# Patient Record
Sex: FEMALE | Race: ASIAN | NOT HISPANIC OR LATINO
[De-identification: names, ages, dates, MRNs, and addresses within clinical notes are randomized per-mention and may not be internally consistent; named-entity substitution may affect disease eponyms.]

---

## 2019-10-09 ENCOUNTER — APPOINTMENT (OUTPATIENT)
Dept: UROLOGY | Facility: CLINIC | Age: 59
End: 2019-10-09
Payer: COMMERCIAL

## 2019-10-09 VITALS
SYSTOLIC BLOOD PRESSURE: 130 MMHG | RESPIRATION RATE: 17 BRPM | TEMPERATURE: 98.2 F | DIASTOLIC BLOOD PRESSURE: 82 MMHG | HEART RATE: 84 BPM | HEIGHT: 62 IN | WEIGHT: 115 LBS | BODY MASS INDEX: 21.16 KG/M2 | OXYGEN SATURATION: 97 %

## 2019-10-09 PROBLEM — Z00.00 ENCOUNTER FOR PREVENTIVE HEALTH EXAMINATION: Status: ACTIVE | Noted: 2019-10-09

## 2019-10-09 PROCEDURE — 99204 OFFICE O/P NEW MOD 45 MIN: CPT

## 2019-10-16 ENCOUNTER — APPOINTMENT (OUTPATIENT)
Age: 59
End: 2019-10-16
Payer: COMMERCIAL

## 2019-10-16 VITALS
SYSTOLIC BLOOD PRESSURE: 132 MMHG | HEART RATE: 81 BPM | BODY MASS INDEX: 20.49 KG/M2 | DIASTOLIC BLOOD PRESSURE: 78 MMHG | TEMPERATURE: 98.3 F | OXYGEN SATURATION: 98 % | RESPIRATION RATE: 17 BRPM | WEIGHT: 112 LBS

## 2019-10-16 DIAGNOSIS — R31.29 OTHER MICROSCOPIC HEMATURIA: ICD-10-CM

## 2019-10-16 PROCEDURE — 52000 CYSTOURETHROSCOPY: CPT

## 2019-10-16 NOTE — ASSESSMENT
[FreeTextEntry1] : 58 yo F with microhematuria\par \par - Reviewed labwork from PCP\par - Discussed possible etiologies for microhematuria including benign (UTI, nephrolithiasis, cyst, BPH) vs malignancy (renal, ureteral and bladder). Discussed hematuria workup which includes CT urogram and cystoscopy. Discussed risk and benefits of cystoscopy.\par

## 2019-10-16 NOTE — PHYSICAL EXAM
[General Appearance - Well Developed] : well developed [General Appearance - Well Nourished] : well nourished [Normal Appearance] : normal appearance [General Appearance - In No Acute Distress] : no acute distress [Well Groomed] : well groomed [Edema] : no peripheral edema [Exaggerated Use Of Accessory Muscles For Inspiration] : no accessory muscle use [Abdomen Soft] : soft [Respiration, Rhythm And Depth] : normal respiratory rhythm and effort [Abdomen Tenderness] : non-tender [Costovertebral Angle Tenderness] : no ~M costovertebral angle tenderness [Urethral Meatus] : normal urethra [Urinary Bladder Findings] : the bladder was normal on palpation [External Female Genitalia] : normal external genitalia [No Focal Deficits] : no focal deficits [Normal Station and Gait] : the gait and station were normal for the patient's age [] : no rash [Affect] : the affect was normal [Oriented To Time, Place, And Person] : oriented to person, place, and time [Mood] : the mood was normal [Not Anxious] : not anxious [No Palpable Adenopathy] : no palpable adenopathy [FreeTextEntry1] : neg CST, no prolapse

## 2019-10-16 NOTE — HISTORY OF PRESENT ILLNESS
[FreeTextEntry1] : 60 yo Mongolian speaking F referred for asymptomatic microhematuria\par no dysuria, no gross hematuria\par no recurrent uti, no history of stones\par no incontinence\par Drinks barely 1 cup of water, 3 large cups of coffee, no tea or soda\par normal bowel movements\par LMP 8 yrs ago\par not sexually active\par 2 children,

## 2021-01-04 DIAGNOSIS — Q40.3 CONGENITAL MALFORMATION OF STOMACH, UNSPECIFIED: ICD-10-CM

## 2021-01-23 ENCOUNTER — APPOINTMENT (OUTPATIENT)
Dept: DISASTER EMERGENCY | Facility: CLINIC | Age: 61
End: 2021-01-23

## 2021-01-23 NOTE — PRE PROCEDURE NOTE - PRE PROCEDURE EVALUATION
Attending Physician:     Jeramy                       Procedure: EGD EUS    Indication for Procedure: 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  ________________________________________________________  PAST MEDICAL & SURGICAL HISTORY:    ALLERGIES:  Allergy Status Unknown    HOME MEDICATIONS:    AICD/PPM: [ ] yes   [ ] no    PERTINENT LAB DATA:                      PHYSICAL EXAMINATION:    T(C): --  HR: --  BP: --  RR: --  SpO2: --    Constitutional: NAD  HEENT: PERRLA, EOMI,    Neck:  No JVD  Respiratory: CTAB/L  Cardiovascular: S1 and S2  Gastrointestinal: BS+, soft, NT/ND  Extremities: No peripheral edema  Neurological: A/O x 3, no focal deficits  Psychiatric: Normal mood, normal affect  Skin: No rashes    ASA Class: I [ ]  II [ ]  III [ ]  IV [ ]    COMMENTS:    The patient is a suitable candidate for the planned procedure unless box checked [ ]  No, explain:     Attending Physician:   Jonathan                   Procedure: EGD EUS    Indication for Procedure: 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  ________________________________________________________  PAST MEDICAL & SURGICAL HISTORY:    ALLERGIES:  Allergy Status Unknown    HOME MEDICATIONS:    AICD/PPM: [ ] yes   [ ] no    PERTINENT LAB DATA:                      PHYSICAL EXAMINATION:    T(C): --  HR: --  BP: --  RR: --  SpO2: --    Constitutional: NAD  HEENT: PERRLA, EOMI,    Neck:  No JVD  Respiratory: CTAB/L  Cardiovascular: S1 and S2  Gastrointestinal: BS+, soft, NT/ND  Extremities: No peripheral edema  Neurological: A/O x 3, no focal deficits  Psychiatric: Normal mood, normal affect  Skin: No rashes    ASA Class: I [ ]  II [ ]  III [ ]  IV [ ]    COMMENTS:    The patient is a suitable candidate for the planned procedure unless box checked [ ]  No, explain:

## 2021-01-26 ENCOUNTER — APPOINTMENT (OUTPATIENT)
Dept: GASTROENTEROLOGY | Facility: HOSPITAL | Age: 61
End: 2021-01-26

## 2021-01-26 ENCOUNTER — OUTPATIENT (OUTPATIENT)
Dept: OUTPATIENT SERVICES | Facility: HOSPITAL | Age: 61
LOS: 1 days | End: 2021-01-26
Payer: MEDICAID

## 2021-01-26 ENCOUNTER — RESULT REVIEW (OUTPATIENT)
Age: 61
End: 2021-01-26

## 2021-01-26 VITALS — SYSTOLIC BLOOD PRESSURE: 106 MMHG | DIASTOLIC BLOOD PRESSURE: 66 MMHG | OXYGEN SATURATION: 99 % | HEART RATE: 78 BPM

## 2021-01-26 VITALS
SYSTOLIC BLOOD PRESSURE: 114 MMHG | RESPIRATION RATE: 19 BRPM | OXYGEN SATURATION: 99 % | HEIGHT: 62 IN | TEMPERATURE: 97 F | HEART RATE: 87 BPM | WEIGHT: 111.99 LBS | DIASTOLIC BLOOD PRESSURE: 62 MMHG

## 2021-01-26 DIAGNOSIS — Q40.3 CONGENITAL MALFORMATION OF STOMACH, UNSPECIFIED: ICD-10-CM

## 2021-01-26 LAB — SARS-COV-2 N GENE NPH QL NAA+PROBE: NOT DETECTED

## 2021-01-26 PROCEDURE — 43259 EGD US EXAM DUODENUM/JEJUNUM: CPT

## 2021-01-26 PROCEDURE — 43239 EGD BIOPSY SINGLE/MULTIPLE: CPT

## 2021-01-26 PROCEDURE — 88305 TISSUE EXAM BY PATHOLOGIST: CPT | Mod: 26

## 2021-01-26 PROCEDURE — 88312 SPECIAL STAINS GROUP 1: CPT | Mod: 26

## 2021-01-26 PROCEDURE — 43239 EGD BIOPSY SINGLE/MULTIPLE: CPT | Mod: GC

## 2021-01-26 PROCEDURE — 88305 TISSUE EXAM BY PATHOLOGIST: CPT

## 2021-01-26 PROCEDURE — 43259 EGD US EXAM DUODENUM/JEJUNUM: CPT | Mod: GC

## 2021-01-26 PROCEDURE — 88312 SPECIAL STAINS GROUP 1: CPT

## 2021-01-26 RX ORDER — SODIUM CHLORIDE 9 MG/ML
500 INJECTION INTRAMUSCULAR; INTRAVENOUS; SUBCUTANEOUS
Refills: 0 | Status: COMPLETED | OUTPATIENT
Start: 2021-01-26 | End: 2021-01-26

## 2021-01-26 RX ADMIN — SODIUM CHLORIDE 30 MILLILITER(S): 9 INJECTION INTRAMUSCULAR; INTRAVENOUS; SUBCUTANEOUS at 08:48

## 2021-01-26 NOTE — ASU PATIENT PROFILE, ADULT - PMH
No pertinent past medical history <<----- Click to add NO pertinent Past Medical History Epigastric pain

## 2021-01-26 NOTE — PRE-ANESTHESIA EVALUATION ADULT - NSANTHOSAYNRD_GEN_A_CORE
No. SYDNIE screening performed.  STOP BANG Legend: 0-2 = LOW Risk; 3-4 = INTERMEDIATE Risk; 5-8 = HIGH Risk

## 2021-01-26 NOTE — ASU DISCHARGE PLAN (ADULT/PEDIATRIC) - NSDISCH_GASTRIC_ENDO_ALL_CORE_FT
You might feel 'gassy' or "crampy'' for a few hours. This is because air was put into the stomach during the procedure. However, if you have continued abdominal (stomach) pain or swelling, contact your doctor right away.
soft/nontender

## 2021-02-09 PROBLEM — R10.13 EPIGASTRIC PAIN: Chronic | Status: ACTIVE | Noted: 2021-01-26

## 2021-02-20 ENCOUNTER — APPOINTMENT (OUTPATIENT)
Dept: DISASTER EMERGENCY | Facility: CLINIC | Age: 61
End: 2021-02-20

## 2021-02-21 LAB — SARS-COV-2 N GENE NPH QL NAA+PROBE: NOT DETECTED

## 2021-02-22 DIAGNOSIS — Z01.812 ENCOUNTER FOR PREPROCEDURAL LABORATORY EXAMINATION: ICD-10-CM

## 2021-02-23 ENCOUNTER — APPOINTMENT (OUTPATIENT)
Dept: GASTROENTEROLOGY | Facility: HOSPITAL | Age: 61
End: 2021-02-23

## 2021-02-28 DIAGNOSIS — Z01.818 ENCOUNTER FOR OTHER PREPROCEDURAL EXAMINATION: ICD-10-CM

## 2021-02-28 RX ORDER — SODIUM CHLORIDE 9 MG/ML
500 INJECTION, SOLUTION INTRAVENOUS
Refills: 0 | Status: DISCONTINUED | OUTPATIENT
Start: 2021-03-04 | End: 2021-03-18

## 2021-03-01 ENCOUNTER — APPOINTMENT (OUTPATIENT)
Dept: DISASTER EMERGENCY | Facility: CLINIC | Age: 61
End: 2021-03-01

## 2021-03-02 LAB — SARS-COV-2 N GENE NPH QL NAA+PROBE: NOT DETECTED

## 2021-03-04 ENCOUNTER — OUTPATIENT (OUTPATIENT)
Dept: OUTPATIENT SERVICES | Facility: HOSPITAL | Age: 61
LOS: 1 days | Discharge: HOME | End: 2021-03-04
Payer: MEDICAID

## 2021-03-04 ENCOUNTER — RESULT REVIEW (OUTPATIENT)
Age: 61
End: 2021-03-04

## 2021-03-04 ENCOUNTER — APPOINTMENT (OUTPATIENT)
Dept: GASTROENTEROLOGY | Facility: HOSPITAL | Age: 61
End: 2021-03-04

## 2021-03-04 VITALS
OXYGEN SATURATION: 99 % | HEART RATE: 77 BPM | WEIGHT: 100.09 LBS | SYSTOLIC BLOOD PRESSURE: 113 MMHG | DIASTOLIC BLOOD PRESSURE: 68 MMHG | TEMPERATURE: 98 F | RESPIRATION RATE: 19 BRPM | HEIGHT: 63 IN

## 2021-03-04 VITALS
HEART RATE: 73 BPM | SYSTOLIC BLOOD PRESSURE: 110 MMHG | OXYGEN SATURATION: 100 % | RESPIRATION RATE: 18 BRPM | DIASTOLIC BLOOD PRESSURE: 62 MMHG

## 2021-03-04 DIAGNOSIS — Q40.3 CONGENITAL MALFORMATION OF STOMACH, UNSPECIFIED: ICD-10-CM

## 2021-03-04 PROCEDURE — 43254 EGD ENDO MUCOSAL RESECTION: CPT

## 2021-03-04 PROCEDURE — 43270 EGD LESION ABLATION: CPT | Mod: 59

## 2021-03-04 PROCEDURE — 88309 TISSUE EXAM BY PATHOLOGIST: CPT | Mod: 26

## 2021-03-04 RX ORDER — OMEPRAZOLE 40 MG/1
40 CAPSULE, DELAYED RELEASE ORAL TWICE DAILY
Qty: 60 | Refills: 5 | Status: ACTIVE | COMMUNITY
Start: 2021-03-04 | End: 1900-01-01

## 2021-03-04 NOTE — ASU PATIENT PROFILE, ADULT - TEACHING/LEARNING CULTURAL CONSIDERATIONS
Health Maintenance Summary     Topic Due On Due Status Completed On    Colorectal Cancer Screening - Colonoscopy  Oct 16, 2020 Not Due Oct 16, 2017    Immunization - Pneumococcal  Completed Jul 11, 2016    Diabetes Eye Exam Jun 30, 2018 Not Due Jun 30, 2017    Glycohemoglobin A1C  (Diabetes Sugar)  Dec 26, 2017 Due On Sep 26, 2017    GFR  (Kidney Function Test)  Sep 26, 2018 Not Due Sep 26, 2017    Diabetes Foot Exam  Jul 11, 2017 Overdue Jul 11, 2016    Medicare Wellness Visit Jun 8, 2018 Due Soon Jun 8, 2017    IMMUNIZATION - DTaP/Tdap/Td Sep 27, 2027 Not Due Sep 27, 2017    Immunization-Influenza  Completed Sep 27, 2017          Patient is due for topics as listed above, he wishes to discuss with provider .             none

## 2021-03-04 NOTE — ASU PATIENT PROFILE, ADULT - INTERNATIONAL TRAVEL
Labs today.- Drawn by MERLENE Sanon  Let me know when she starts lemtrada. Weekly CBC x 4 and after that CBC every 14 days for 8 weeks.  Please, get her neurologist on phone.  Dr. Favian Redman [facility] [phone].  Follow up as needed.     No

## 2021-03-10 LAB — SURGICAL PATHOLOGY STUDY: SIGNIFICANT CHANGE UP

## 2021-06-18 RX ORDER — SODIUM CHLORIDE 9 MG/ML
500 INJECTION INTRAMUSCULAR; INTRAVENOUS; SUBCUTANEOUS
Refills: 0 | Status: DISCONTINUED | OUTPATIENT
Start: 2021-06-21 | End: 2021-07-05

## 2021-06-21 ENCOUNTER — RESULT REVIEW (OUTPATIENT)
Age: 61
End: 2021-06-21

## 2021-06-21 ENCOUNTER — OUTPATIENT (OUTPATIENT)
Dept: OUTPATIENT SERVICES | Facility: HOSPITAL | Age: 61
LOS: 1 days | Discharge: HOME | End: 2021-06-21
Payer: MEDICAID

## 2021-06-21 ENCOUNTER — APPOINTMENT (OUTPATIENT)
Dept: GASTROENTEROLOGY | Facility: HOSPITAL | Age: 61
End: 2021-06-21

## 2021-06-21 VITALS
OXYGEN SATURATION: 98 % | RESPIRATION RATE: 17 BRPM | HEIGHT: 62 IN | DIASTOLIC BLOOD PRESSURE: 65 MMHG | WEIGHT: 110.01 LBS | HEART RATE: 81 BPM | TEMPERATURE: 98 F | SYSTOLIC BLOOD PRESSURE: 109 MMHG

## 2021-06-21 VITALS
OXYGEN SATURATION: 99 % | HEART RATE: 73 BPM | DIASTOLIC BLOOD PRESSURE: 67 MMHG | RESPIRATION RATE: 15 BRPM | SYSTOLIC BLOOD PRESSURE: 105 MMHG

## 2021-06-21 DIAGNOSIS — Q40.3 CONGENITAL MALFORMATION OF STOMACH, UNSPECIFIED: ICD-10-CM

## 2021-06-21 PROCEDURE — 43270 EGD LESION ABLATION: CPT | Mod: 59

## 2021-06-21 PROCEDURE — 43239 EGD BIOPSY SINGLE/MULTIPLE: CPT | Mod: 59

## 2021-06-21 PROCEDURE — 88305 TISSUE EXAM BY PATHOLOGIST: CPT | Mod: 26

## 2021-06-21 PROCEDURE — 43242 EGD US FINE NEEDLE BX/ASPIR: CPT

## 2021-06-22 LAB — SURGICAL PATHOLOGY STUDY: SIGNIFICANT CHANGE UP

## 2022-03-14 NOTE — ASU PATIENT PROFILE, ADULT - ANESTHESIA, PREVIOUS REACTION, PROFILE
Pt had Right Lumbar MB RFA on 1/27/2022.  Pt states she only received 25-30% relief at this time.    Pt rates pain 5/10 with standing and walking,  It increases with bending/ increased activity.  Pt states the pain is an uncomfortable ache when standing and sharp with bending.    Pt would like to know next steps.     Will forward to Dr. Arango for recommendations/orders.   none

## 2025-01-21 NOTE — ASU PATIENT PROFILE, ADULT - HEALTH/HEALTHCARE ANXIETIES, PROFILE
Patient: Tushar Panda    Procedure Summary       Date: 01/21/25 Room / Location: [facility] ENDOSCOPY 3 / Marcum and Wallace Memorial Marco Guerrero MD Provider: Cristopher Le Alleviate perioperative anxiety, nausea/vomiting, and pain.